# Patient Record
Sex: MALE | Race: WHITE | URBAN - METROPOLITAN AREA
[De-identification: names, ages, dates, MRNs, and addresses within clinical notes are randomized per-mention and may not be internally consistent; named-entity substitution may affect disease eponyms.]

---

## 2019-01-18 ENCOUNTER — OFFICE VISIT (OUTPATIENT)
Dept: NEUROLOGY | Facility: CLINIC | Age: 44
End: 2019-01-18
Payer: COMMERCIAL

## 2019-01-18 VITALS
BODY MASS INDEX: 30.1 KG/M2 | SYSTOLIC BLOOD PRESSURE: 118 MMHG | HEART RATE: 72 BPM | RESPIRATION RATE: 17 BRPM | HEIGHT: 71 IN | DIASTOLIC BLOOD PRESSURE: 82 MMHG | WEIGHT: 215 LBS

## 2019-01-18 DIAGNOSIS — M79.18 MYOFASCIAL PAIN: Primary | ICD-10-CM

## 2019-01-18 DIAGNOSIS — M47.812 SPONDYLOSIS OF CERVICAL REGION WITHOUT MYELOPATHY OR RADICULOPATHY: ICD-10-CM

## 2019-01-18 PROCEDURE — 99214 OFFICE O/P EST MOD 30 MIN: CPT | Performed by: PHYSICAL MEDICINE & REHABILITATION

## 2019-01-18 RX ORDER — FLUTICASONE PROPIONATE AND SALMETEROL 100; 50 UG/1; UG/1
1 POWDER RESPIRATORY (INHALATION) 2 TIMES DAILY
COMMUNITY

## 2019-01-18 RX ORDER — MONTELUKAST SODIUM 10 MG/1
10 TABLET ORAL NIGHTLY
COMMUNITY

## 2019-01-18 RX ORDER — DULOXETIN HYDROCHLORIDE 60 MG/1
60 CAPSULE, DELAYED RELEASE ORAL DAILY
Qty: 30 CAPSULE | Refills: 1 | Status: SHIPPED | OUTPATIENT
Start: 2019-01-18 | End: 2019-03-01

## 2019-01-18 RX ORDER — DULOXETIN HYDROCHLORIDE 30 MG/1
30 CAPSULE, DELAYED RELEASE ORAL DAILY
COMMUNITY
End: 2019-01-18

## 2019-01-18 RX ORDER — DICLOXACILLIN SODIUM 250 MG/1
250 CAPSULE ORAL 4 TIMES DAILY
COMMUNITY
End: 2019-03-01

## 2019-01-18 RX ORDER — CHLORAL HYDRATE 500 MG
1000 CAPSULE ORAL DAILY
COMMUNITY

## 2019-01-18 NOTE — PROGRESS NOTES
[address]  Progress Note    CHIEF COMPLAINT:  Patient presents with:  Neck Pain: LOV 12/11/18 Pt states that his neck is stiff, denies any pain at this time.  Pt mentioned that he had a fall over the weekend Disp:  Rfl:    Dicloxacillin Sodium 250 MG Oral Cap Take 250 mg by mouth 4 (four) times daily. Disp:  Rfl:    omega-3 fatty acids 1000 MG Oral Cap Take 1,000 mg by mouth daily. Disp:  Rfl:    Misc Natural Products ([facility]) Oral Cap Take by mouth.  Dis Plain x-rays of the cervical spine show minimal spondylosis. 3. An EMG of the right upper extremity from 2015 was normal.    ASSESSMENT AND PLAN:  1. Myofascial pain  Is doing relatively well. Today I recommended we increase his Cymbalta to 60 mg/day.

## 2019-01-18 NOTE — PATIENT INSTRUCTIONS
[address]  2010 [facility], [address] [place] [address]  Dept: [phone]    Kamini Ga MD  Physical Medicine    PLEASE READ CAREFULLY AND COMPLETELY FOR YOUR KNOWLEDGE AND SAFETY      Facet Joint Inj · Minor side effects occur in approximately 1-5% of patients and can last several days. These can include heartburn, increased appetite, trouble sleeping, low grade fever, redness of the face, headache, and minor pain at the injection site.   · Rare but po

## 2019-02-16 PROBLEM — M79.18 MYOFASCIAL PAIN: Status: ACTIVE | Noted: 2019-02-16

## 2019-02-16 PROBLEM — M47.812 SPONDYLOSIS OF CERVICAL REGION WITHOUT MYELOPATHY OR RADICULOPATHY: Status: ACTIVE | Noted: 2019-02-16

## 2019-02-28 ENCOUNTER — MED REC SCAN ONLY (OUTPATIENT)
Dept: NEUROLOGY | Facility: CLINIC | Age: 44
End: 2019-02-28

## 2019-03-01 ENCOUNTER — TELEPHONE (OUTPATIENT)
Dept: NEUROLOGY | Facility: CLINIC | Age: 44
End: 2019-03-01

## 2019-03-01 ENCOUNTER — OFFICE VISIT (OUTPATIENT)
Dept: NEUROLOGY | Facility: CLINIC | Age: 44
End: 2019-03-01
Payer: COMMERCIAL

## 2019-03-01 VITALS
HEIGHT: 71 IN | SYSTOLIC BLOOD PRESSURE: 118 MMHG | BODY MASS INDEX: 29.96 KG/M2 | DIASTOLIC BLOOD PRESSURE: 82 MMHG | WEIGHT: 214 LBS | HEART RATE: 80 BPM

## 2019-03-01 DIAGNOSIS — M47.812 SPONDYLOSIS OF CERVICAL REGION WITHOUT MYELOPATHY OR RADICULOPATHY: ICD-10-CM

## 2019-03-01 DIAGNOSIS — M79.18 MYOFASCIAL PAIN: Primary | ICD-10-CM

## 2019-03-01 PROCEDURE — 99214 OFFICE O/P EST MOD 30 MIN: CPT | Performed by: PHYSICAL MEDICINE & REHABILITATION

## 2019-03-01 RX ORDER — DICLOFENAC SODIUM 75 MG/1
75 TABLET, DELAYED RELEASE ORAL AS NEEDED
COMMUNITY
Start: 2018-06-20 | End: 2019-03-01

## 2019-03-01 RX ORDER — ALBUTEROL SULFATE 90 UG/1
2 AEROSOL, METERED RESPIRATORY (INHALATION)
COMMUNITY
Start: 2019-02-15

## 2019-03-01 RX ORDER — DULOXETIN HYDROCHLORIDE 60 MG/1
60 CAPSULE, DELAYED RELEASE ORAL DAILY
Qty: 90 CAPSULE | Refills: 1 | Status: SHIPPED | OUTPATIENT
Start: 2019-03-01 | End: 2019-07-26

## 2019-03-01 NOTE — TELEPHONE ENCOUNTER
Called [facility] for authorization of approval of C45, C56, C67 bilateral Facet injections cpt codes [id number], [id number], [id number]. Per [facility] automated telephone system, no authorization is required. No Reference number was provided  Will inform Nursing.

## 2019-03-01 NOTE — PROGRESS NOTES
[address]  Progress Note    CHIEF COMPLAINT:  Patient presents with:  Neck Pain: LOV: 1/18/19. F/U on neck pain. Patient states that he has been doing okay since last visit.  Patient states that he still has Particles Take 1 capsule (60 mg total) by mouth daily. Disp: 90 capsule Rfl: 1   Montelukast Sodium 10 MG Oral Tab Take 10 mg by mouth nightly.  Disp:  Rfl:    fluticasone-salmeterol 100-50 MCG/DOSE Inhalation Aerosol Powder, Breath Activated Inhale 1 puff better on Cymbalta. He already is doing acupuncture as needed and e-stim and traction. I think that is about all we can do other than eliminate stress, which is impossible. He will continue Cymbalta indefinitely.   I gave him a 90-day supply of 60 mg tab

## 2019-03-04 NOTE — TELEPHONE ENCOUNTER
Patient has been scheduled for a C45, C56, C67 bilateral facet injections on 04/04/19 at the [facility]. Medications and allergies reviewed.  Patient informed to hold aspirins, nsaids, blood thinners, multivitamins, vitamin E and fish oils 3-7 days prior to proce

## 2019-03-13 ENCOUNTER — HOSPITAL (OUTPATIENT)
Dept: OTHER | Age: 44
End: 2019-03-13
Attending: EMERGENCY MEDICINE

## 2019-03-21 RX ORDER — DULOXETIN HYDROCHLORIDE 60 MG/1
60 CAPSULE, DELAYED RELEASE ORAL DAILY
Qty: 30 CAPSULE | Refills: 0 | OUTPATIENT
Start: 2019-03-21

## 2019-03-21 NOTE — TELEPHONE ENCOUNTER
Duloxetine 60mg. Take 1 capsule daily. #30. No refills.     Last filled-3/1/2019 filled for 6 month supply    RX denied
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
## 2019-04-16 ENCOUNTER — OFFICE VISIT (OUTPATIENT)
Dept: NEUROLOGY | Facility: CLINIC | Age: 44
End: 2019-04-16
Payer: COMMERCIAL

## 2019-04-16 VITALS
RESPIRATION RATE: 16 BRPM | DIASTOLIC BLOOD PRESSURE: 80 MMHG | HEART RATE: 80 BPM | SYSTOLIC BLOOD PRESSURE: 122 MMHG | BODY MASS INDEX: 30.1 KG/M2 | WEIGHT: 215 LBS | HEIGHT: 71 IN

## 2019-04-16 DIAGNOSIS — R20.2 NUMBNESS AND TINGLING IN BOTH HANDS: ICD-10-CM

## 2019-04-16 DIAGNOSIS — S43.401A SPRAIN OF RIGHT SHOULDER, UNSPECIFIED SHOULDER SPRAIN TYPE, INITIAL ENCOUNTER: ICD-10-CM

## 2019-04-16 DIAGNOSIS — M47.812 SPONDYLOSIS OF CERVICAL REGION WITHOUT MYELOPATHY OR RADICULOPATHY: ICD-10-CM

## 2019-04-16 DIAGNOSIS — M79.18 MYOFASCIAL PAIN: ICD-10-CM

## 2019-04-16 DIAGNOSIS — S13.9XXA NECK SPRAIN, INITIAL ENCOUNTER: Primary | ICD-10-CM

## 2019-04-16 DIAGNOSIS — S33.5XXA LUMBAR SPRAIN, INITIAL ENCOUNTER: ICD-10-CM

## 2019-04-16 DIAGNOSIS — R20.0 NUMBNESS AND TINGLING IN BOTH HANDS: ICD-10-CM

## 2019-04-16 PROCEDURE — 99214 OFFICE O/P EST MOD 30 MIN: CPT | Performed by: PHYSICAL MEDICINE & REHABILITATION

## 2019-04-16 RX ORDER — CYCLOBENZAPRINE HCL 10 MG
10 TABLET ORAL 3 TIMES DAILY PRN
COMMUNITY
Start: 2019-03-21

## 2019-04-16 RX ORDER — MELOXICAM 15 MG/1
15 TABLET ORAL DAILY
COMMUNITY
Start: 2019-03-21

## 2019-04-16 RX ORDER — PREDNISONE 10 MG/1
TABLET ORAL
Qty: 28 TABLET | Refills: 0 | Status: SHIPPED | OUTPATIENT
Start: 2019-04-16 | End: 2019-07-26 | Stop reason: ALTCHOICE
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
## 2019-06-27 ENCOUNTER — TELEPHONE (OUTPATIENT)
Dept: NEUROLOGY | Facility: CLINIC | Age: 44
End: 2019-06-27

## 2019-06-27 NOTE — TELEPHONE ENCOUNTER
Received disc of MRI of cervical spine done 6/11/19 at [facility] in [place] on 's desk for review. Received disc of MRI of Lumbar spine done 5/21/19 at [facility] in [place] on 's desk for review.     Disc loaded to [facility] PACs a

## 2019-06-28 ENCOUNTER — OFFICE VISIT (OUTPATIENT)
Dept: NEUROLOGY | Facility: CLINIC | Age: 44
End: 2019-06-28
Payer: COMMERCIAL

## 2019-06-28 VITALS
SYSTOLIC BLOOD PRESSURE: 117 MMHG | BODY MASS INDEX: 30.1 KG/M2 | RESPIRATION RATE: 18 BRPM | DIASTOLIC BLOOD PRESSURE: 81 MMHG | WEIGHT: 215 LBS | HEART RATE: 94 BPM | HEIGHT: 71 IN

## 2019-06-28 DIAGNOSIS — S43.401A SPRAIN OF RIGHT SHOULDER, UNSPECIFIED SHOULDER SPRAIN TYPE, INITIAL ENCOUNTER: ICD-10-CM

## 2019-06-28 DIAGNOSIS — S13.9XXA NECK SPRAIN, INITIAL ENCOUNTER: Primary | ICD-10-CM

## 2019-06-28 DIAGNOSIS — S33.5XXA LUMBAR SPRAIN, INITIAL ENCOUNTER: ICD-10-CM

## 2019-06-28 DIAGNOSIS — M47.812 SPONDYLOSIS OF CERVICAL REGION WITHOUT MYELOPATHY OR RADICULOPATHY: ICD-10-CM

## 2019-06-28 DIAGNOSIS — R20.2 NUMBNESS AND TINGLING IN BOTH HANDS: ICD-10-CM

## 2019-06-28 DIAGNOSIS — R20.0 NUMBNESS AND TINGLING IN BOTH HANDS: ICD-10-CM

## 2019-06-28 DIAGNOSIS — M79.18 MYOFASCIAL PAIN: ICD-10-CM

## 2019-06-28 PROCEDURE — 99214 OFFICE O/P EST MOD 30 MIN: CPT | Performed by: PHYSICAL MEDICINE & REHABILITATION
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
## 2019-07-03 ENCOUNTER — TELEPHONE (OUTPATIENT)
Dept: NEUROLOGY | Facility: CLINIC | Age: 44
End: 2019-07-03

## 2019-07-03 NOTE — TELEPHONE ENCOUNTER
Received call from [facility] spoke to Eduardo CHOWDHURY who stated patient call his plan directly to confirm if  authorization of approval was required for Bilateral C4-5, C5-6 and C6-7 facet injections cpt codes 08984,[id number]  Per Darrell Luciano  No prior authorization is

## 2019-07-11 ENCOUNTER — MED REC SCAN ONLY (OUTPATIENT)
Dept: NEUROLOGY | Facility: CLINIC | Age: 44
End: 2019-07-11

## 2019-07-11 ENCOUNTER — OFFICE VISIT (OUTPATIENT)
Dept: SURGERY | Facility: CLINIC | Age: 44
End: 2019-07-11
Payer: COMMERCIAL

## 2019-07-11 DIAGNOSIS — M47.812 SPONDYLOSIS OF CERVICAL REGION WITHOUT MYELOPATHY OR RADICULOPATHY: Primary | ICD-10-CM

## 2019-07-11 PROCEDURE — 99152 MOD SED SAME PHYS/QHP 5/>YRS: CPT | Performed by: PHYSICAL MEDICINE & REHABILITATION

## 2019-07-11 PROCEDURE — 64491 INJ PARAVERT F JNT C/T 2 LEV: CPT | Performed by: PHYSICAL MEDICINE & REHABILITATION

## 2019-07-11 PROCEDURE — 64492 INJ PARAVERT F JNT C/T 3 LEV: CPT | Performed by: PHYSICAL MEDICINE & REHABILITATION

## 2019-07-11 PROCEDURE — 99153 MOD SED SAME PHYS/QHP EA: CPT | Performed by: PHYSICAL MEDICINE & REHABILITATION

## 2019-07-11 PROCEDURE — 64490 INJ PARAVERT F JNT C/T 1 LEV: CPT | Performed by: PHYSICAL MEDICINE & REHABILITATION

## 2019-07-18 NOTE — PROCEDURES
Preoperative Diagnosis:  (M47.812) Spondylosis of cervical region without myelopathy or radiculopathy  (primary encounter diagnosis)       Postoperative Diagnosis:  (M47.812) Spondylosis of cervical region without myelopathy or radiculopathy  (primary enco

## 2019-07-26 ENCOUNTER — OFFICE VISIT (OUTPATIENT)
Dept: NEUROLOGY | Facility: CLINIC | Age: 44
End: 2019-07-26
Payer: COMMERCIAL

## 2019-07-26 VITALS
DIASTOLIC BLOOD PRESSURE: 84 MMHG | RESPIRATION RATE: 16 BRPM | HEIGHT: 71 IN | WEIGHT: 220 LBS | BODY MASS INDEX: 30.8 KG/M2 | SYSTOLIC BLOOD PRESSURE: 132 MMHG | HEART RATE: 88 BPM

## 2019-07-26 DIAGNOSIS — M79.18 MYOFASCIAL PAIN: ICD-10-CM

## 2019-07-26 DIAGNOSIS — R20.2 NUMBNESS AND TINGLING IN BOTH HANDS: ICD-10-CM

## 2019-07-26 DIAGNOSIS — M47.812 SPONDYLOSIS OF CERVICAL REGION WITHOUT MYELOPATHY OR RADICULOPATHY: ICD-10-CM

## 2019-07-26 DIAGNOSIS — S13.9XXA NECK SPRAIN, INITIAL ENCOUNTER: Primary | ICD-10-CM

## 2019-07-26 DIAGNOSIS — R20.0 NUMBNESS AND TINGLING IN BOTH HANDS: ICD-10-CM

## 2019-07-26 DIAGNOSIS — S43.401A SPRAIN OF RIGHT SHOULDER, UNSPECIFIED SHOULDER SPRAIN TYPE, INITIAL ENCOUNTER: ICD-10-CM

## 2019-07-26 PROCEDURE — 99214 OFFICE O/P EST MOD 30 MIN: CPT | Performed by: PHYSICAL MEDICINE & REHABILITATION

## 2019-07-26 RX ORDER — DULOXETIN HYDROCHLORIDE 60 MG/1
60 CAPSULE, DELAYED RELEASE ORAL DAILY
Qty: 90 CAPSULE | Refills: 1 | Status: SHIPPED | OUTPATIENT
Start: 2019-07-26 | End: 2019-07-26 | Stop reason: CLARIF

## 2019-07-26 RX ORDER — DULOXETIN HYDROCHLORIDE 60 MG/1
60 CAPSULE, DELAYED RELEASE ORAL DAILY
Qty: 90 CAPSULE | Refills: 1 | Status: SHIPPED | OUTPATIENT
Start: 2019-07-26 | End: 2020-01-22

## 2019-07-26 NOTE — PROGRESS NOTES
[address]  Progress Note    CHIEF COMPLAINT:  Patient presents with:  Neck Pain: Patient presents for follow up on  Bilateral C45, C56 and C67 Facet injection 7/11/19, got 50-60% relief, still c/o stiffness Particles Take 1 capsule (60 mg total) by mouth daily. 90 capsule 1   • Meloxicam 15 MG Oral Tab Take 15 mg by mouth daily. • Cyclobenzaprine HCl 10 MG Oral Tab Take 10 mg by mouth 3 (three) times daily as needed.      • Albuterol Sulfate  (90 Ba upper extremities  Reflexes: Normal upper extremities  Spurling's sign: neg        Data    Radiology Imagin.  films of a lumbar MRI on internal PACS showing DDD and spondylosis.  No significant nerve root impingement or central canal stenosis.    2.  f

## 2019-09-03 ENCOUNTER — TELEPHONE (OUTPATIENT)
Dept: NEUROLOGY | Facility: CLINIC | Age: 44
End: 2019-09-03

## 2019-09-03 ENCOUNTER — OFFICE VISIT (OUTPATIENT)
Dept: NEUROLOGY | Facility: CLINIC | Age: 44
End: 2019-09-03
Payer: COMMERCIAL

## 2019-09-03 VITALS
HEART RATE: 76 BPM | RESPIRATION RATE: 16 BRPM | SYSTOLIC BLOOD PRESSURE: 108 MMHG | WEIGHT: 215 LBS | BODY MASS INDEX: 30.1 KG/M2 | DIASTOLIC BLOOD PRESSURE: 76 MMHG | HEIGHT: 71 IN

## 2019-09-03 DIAGNOSIS — S13.9XXA NECK SPRAIN, INITIAL ENCOUNTER: Primary | ICD-10-CM

## 2019-09-03 DIAGNOSIS — M47.812 CERVICAL SPONDYLOSIS: ICD-10-CM

## 2019-09-03 DIAGNOSIS — S43.401A SPRAIN OF RIGHT SHOULDER, UNSPECIFIED SHOULDER SPRAIN TYPE, INITIAL ENCOUNTER: ICD-10-CM

## 2019-09-03 DIAGNOSIS — M79.18 MYOFASCIAL PAIN: ICD-10-CM

## 2019-09-03 PROCEDURE — 99214 OFFICE O/P EST MOD 30 MIN: CPT | Performed by: PHYSICAL MEDICINE & REHABILITATION

## 2019-09-03 RX ORDER — ERGOCALCIFEROL 1.25 MG/1
50000 CAPSULE ORAL
COMMUNITY
Start: 2019-08-15

## 2019-09-03 NOTE — TELEPHONE ENCOUNTER
Called [facility] for authorization of approval of Bilateral C4-5, C5-6 and C6-7 facet injections cpt codes 16877-57, [id number], [id number]. Per telephone automated system, no authorization is required Reference number was not provided. Will inform Nursing.

## 2019-09-03 NOTE — PROGRESS NOTES
[address]  Progress Note    CHIEF COMPLAINT:  Patient presents with:  Neck Pain: Patient present for follow up on Neck pain. LOV: 7/26/19. MRI of neck and lower back 6/11/19 at [facility].  Sleep is improved, bu HISTORY:  History reviewed. No pertinent past medical history.     SURGICAL HISTORY:  Past Surgical History:   Procedure Laterality Date   • BACK SURGERY  2017    lower back    • KNEE SURGERY Right 2016       SOCIAL HISTORY:   Social History    Occupational Stiffness: denies  Painful Joints: denies   Neurological  Loss of Strength Since last Visit: denies  Tingling/Numbness: denies            All other systems reviewed and are negative. Pertinent positives and negatives noted in the HPI.         PHYSICAL EXAM: Placed This Encounter      ergocalciferol 26704 units Oral Cap      RTC    Return for 2 week post injection follow up. Discharge Instructions were provided as documented in AVS summary. The patient was in agreement with the assessment and plan.   All

## 2019-09-05 ENCOUNTER — MED REC SCAN ONLY (OUTPATIENT)
Dept: NEUROLOGY | Facility: CLINIC | Age: 44
End: 2019-09-05

## 2019-09-11 NOTE — TELEPHONE ENCOUNTER
Patient has been scheduled for a Bilateral C4-5, C5-6 and C6-7 facet injections on 10/10/19 at the [facility]. Medications and allergies reviewed.  Patient informed to hold aspirins, nsaids, blood thinners, multivitamins, vitamin E and fish oils 3-7 days prior to

## 2019-10-10 ENCOUNTER — OFFICE VISIT (OUTPATIENT)
Dept: SURGERY | Facility: CLINIC | Age: 44
End: 2019-10-10
Payer: COMMERCIAL

## 2019-10-10 DIAGNOSIS — M47.812 SPONDYLOSIS OF CERVICAL REGION WITHOUT MYELOPATHY OR RADICULOPATHY: Primary | ICD-10-CM

## 2019-10-10 PROCEDURE — 64492 INJ PARAVERT F JNT C/T 3 LEV: CPT | Performed by: PHYSICAL MEDICINE & REHABILITATION

## 2019-10-10 PROCEDURE — 64491 INJ PARAVERT F JNT C/T 2 LEV: CPT | Performed by: PHYSICAL MEDICINE & REHABILITATION

## 2019-10-10 PROCEDURE — 99152 MOD SED SAME PHYS/QHP 5/>YRS: CPT | Performed by: PHYSICAL MEDICINE & REHABILITATION

## 2019-10-10 PROCEDURE — 99153 MOD SED SAME PHYS/QHP EA: CPT | Performed by: PHYSICAL MEDICINE & REHABILITATION

## 2019-10-10 PROCEDURE — 64490 INJ PARAVERT F JNT C/T 1 LEV: CPT | Performed by: PHYSICAL MEDICINE & REHABILITATION

## 2019-11-01 ENCOUNTER — OFFICE VISIT (OUTPATIENT)
Dept: NEUROLOGY | Facility: CLINIC | Age: 44
End: 2019-11-01
Payer: COMMERCIAL

## 2019-11-01 VITALS
WEIGHT: 215 LBS | HEART RATE: 84 BPM | RESPIRATION RATE: 20 BRPM | HEIGHT: 71 IN | SYSTOLIC BLOOD PRESSURE: 108 MMHG | BODY MASS INDEX: 30.1 KG/M2 | DIASTOLIC BLOOD PRESSURE: 72 MMHG

## 2019-11-01 DIAGNOSIS — M79.18 MYOFASCIAL PAIN: ICD-10-CM

## 2019-11-01 DIAGNOSIS — S13.9XXA NECK SPRAIN, INITIAL ENCOUNTER: Primary | ICD-10-CM

## 2019-11-01 PROCEDURE — 99213 OFFICE O/P EST LOW 20 MIN: CPT | Performed by: PHYSICAL MEDICINE & REHABILITATION

## 2019-11-01 NOTE — PROGRESS NOTES
[address]  Progress Note    CHIEF COMPLAINT:  Patient presents with:  Neck Pain: Patient presents for follow up on neck pain. Patient reports that he is much better. Does not have raditing pain.  Rates pain fluticasone-salmeterol 100-50 MCG/DOSE Inhalation Aerosol Powder, Breath Activated, Inhale 1 puff into the lungs 2 (two) times daily. , Disp: , Rfl:   omega-3 fatty acids 1000 MG Oral Cap, Take 1,000 mg by mouth daily. , Disp: , Rfl:   Misc Natural Product stenosis. 2.  films of a cervical MRI on internal PACS showing DDD and spondylosis.  No significant nerve root impingement or central canal stenosis. ASSESSMENT AND PLAN:  1.  Neck sprain, initial encounter  Tasha Freda is doing well after facet injection

## 2020-02-18 RX ORDER — DULOXETIN HYDROCHLORIDE 60 MG/1
60 CAPSULE, DELAYED RELEASE ORAL DAILY
Qty: 90 CAPSULE | Refills: 0 | Status: SHIPPED | OUTPATIENT
Start: 2020-02-18 | End: 2020-05-20

## 2020-02-18 NOTE — TELEPHONE ENCOUNTER
Medication request: Duloxetine 60 mg, Take 1 capsule by mouth daily.  Qt 30 Refills 0    LOV:11/1/2019  NOV:5/1/2020    Last refill:7/26/2019

## 2020-05-20 RX ORDER — DULOXETIN HYDROCHLORIDE 60 MG/1
CAPSULE, DELAYED RELEASE ORAL
Qty: 90 CAPSULE | Refills: 0 | Status: SHIPPED | OUTPATIENT
Start: 2020-05-20 | End: 2020-08-11

## 2020-05-20 NOTE — TELEPHONE ENCOUNTER
Refill request for duloxetine 60 mg, take 1 cap daily, #90, no refills    LOV: 11/1/19  NOV: 6/12/20  Last refilled on 2/18/20

## 2020-08-11 RX ORDER — DULOXETIN HYDROCHLORIDE 60 MG/1
CAPSULE, DELAYED RELEASE ORAL
Qty: 90 CAPSULE | Refills: 3 | Status: SHIPPED | OUTPATIENT
Start: 2020-08-11 | End: 2021-08-12

## 2020-08-11 NOTE — TELEPHONE ENCOUNTER
Refill request for duloxetine 60 mg, take 1 cap daily, #90, no refills    LOV: 11/1/19  NOV: None  Last refilled on 5/20/20

## 2021-08-06 NOTE — TELEPHONE ENCOUNTER
Medication request: Duloxetine 60mg TAKE ONE CAPSULE BY MOUTH ONE TIME DAILY    LOV: 11/01/19  NOV: 08/31/21    [place] 08/11/20 #90 r-3

## 2021-08-06 NOTE — TELEPHONE ENCOUNTER
Spoke with pt and scheduled appt on 8/31. States he has about a week left of medication and is concerned about pain coming back when he runs out next week.  Please advise

## 2021-08-12 RX ORDER — DULOXETIN HYDROCHLORIDE 60 MG/1
CAPSULE, DELAYED RELEASE ORAL
Qty: 90 CAPSULE | Refills: 0 | Status: SHIPPED | OUTPATIENT
Start: 2021-08-12 | End: 2021-11-26

## 2021-09-17 ENCOUNTER — OFFICE VISIT (OUTPATIENT)
Dept: PHYSICAL MEDICINE AND REHAB | Facility: CLINIC | Age: 46
End: 2021-09-17
Payer: COMMERCIAL

## 2021-09-17 VITALS
HEART RATE: 86 BPM | DIASTOLIC BLOOD PRESSURE: 72 MMHG | HEIGHT: 71 IN | WEIGHT: 222 LBS | BODY MASS INDEX: 31.08 KG/M2 | SYSTOLIC BLOOD PRESSURE: 108 MMHG | OXYGEN SATURATION: 98 %

## 2021-09-17 DIAGNOSIS — M79.18 MYOFASCIAL PAIN: Primary | ICD-10-CM

## 2021-09-17 PROCEDURE — 99213 OFFICE O/P EST LOW 20 MIN: CPT | Performed by: PHYSICAL MEDICINE & REHABILITATION

## 2021-09-17 PROCEDURE — 3074F SYST BP LT 130 MM HG: CPT | Performed by: PHYSICAL MEDICINE & REHABILITATION

## 2021-09-17 PROCEDURE — 3078F DIAST BP <80 MM HG: CPT | Performed by: PHYSICAL MEDICINE & REHABILITATION

## 2021-09-17 PROCEDURE — 3008F BODY MASS INDEX DOCD: CPT | Performed by: PHYSICAL MEDICINE & REHABILITATION

## 2021-09-17 NOTE — PROGRESS NOTES
[address]  Progress Note    CHIEF COMPLAINT:  Patient presents with:  Neck Pain: LOV 11/1/19 Pt comes in for f/u neck pain. Still has knots/stiffness in neck. Perri Sahil Has N/T in both hands including all digits.  St Products ([facility]) Oral Cap Take by mouth. • ergocalciferol 68754 units Oral Cap Take 50,000 Int'l Units by mouth.  (Patient not taking: Reported on 9/17/2021)     • Cyclobenzaprine HCl 10 MG Oral Tab Take 10 mg by mouth 3 (three) times daily as ne He is just checking back with me. No new medical problems, Cymbalta is working well. He will continue with that indefinitely. Return in 1 year. No orders of the defined types were placed in this encounter.         Discharge Instructions were provided as

## 2021-11-29 RX ORDER — DULOXETIN HYDROCHLORIDE 60 MG/1
60 CAPSULE, DELAYED RELEASE ORAL DAILY
Qty: 90 CAPSULE | Refills: 3 | Status: SHIPPED | OUTPATIENT
Start: 2021-11-29 | End: 2022-11-24

## 2021-11-29 NOTE — TELEPHONE ENCOUNTER
Medication request:  DULOXETINE 60 MG Oral Cap DR Particles  Sig:   TAKE ONE CAPSULE BY MOUTH ONE TIME DAILY     LOV: 09/17/2021  NOV: 09/16/2022    [place]: 08/12/2021  Qty: 90, #R:0, 90 D supply

## 2021-12-01 RX ORDER — DULOXETIN HYDROCHLORIDE 60 MG/1
CAPSULE, DELAYED RELEASE ORAL
Qty: 90 CAPSULE | Refills: 0 | OUTPATIENT
Start: 2021-12-01

## 2022-10-07 ENCOUNTER — OFFICE VISIT (OUTPATIENT)
Dept: PHYSICAL MEDICINE AND REHAB | Facility: CLINIC | Age: 47
End: 2022-10-07
Payer: COMMERCIAL

## 2022-10-07 VITALS — HEART RATE: 82 BPM | WEIGHT: 238 LBS | OXYGEN SATURATION: 97 % | BODY MASS INDEX: 33 KG/M2

## 2022-10-07 DIAGNOSIS — M79.18 MYOFASCIAL PAIN: Primary | ICD-10-CM

## 2022-10-07 PROCEDURE — 99213 OFFICE O/P EST LOW 20 MIN: CPT | Performed by: PHYSICAL MEDICINE & REHABILITATION

## 2022-11-08 RX ORDER — DULOXETIN HYDROCHLORIDE 60 MG/1
60 CAPSULE, DELAYED RELEASE ORAL DAILY
Qty: 90 CAPSULE | Refills: 3 | Status: SHIPPED | OUTPATIENT
Start: 2022-11-08 | End: 2023-11-03

## 2023-01-24 ENCOUNTER — OFFICE VISIT (OUTPATIENT)
Facility: LOCATION | Age: 48
End: 2023-01-24
Payer: COMMERCIAL

## 2023-01-24 VITALS — HEART RATE: 82 BPM | TEMPERATURE: 98 F

## 2023-01-24 DIAGNOSIS — Z80.0 FAMILY HISTORY OF COLON CANCER IN FATHER: ICD-10-CM

## 2023-01-24 DIAGNOSIS — L29.0 PRURITUS ANI: ICD-10-CM

## 2023-01-24 DIAGNOSIS — K64.4 ANAL SKIN TAG: ICD-10-CM

## 2023-01-24 DIAGNOSIS — K60.1 CHRONIC POSTERIOR ANAL FISSURE: Primary | ICD-10-CM

## 2023-01-24 PROCEDURE — 99244 OFF/OP CNSLTJ NEW/EST MOD 40: CPT | Performed by: COLON & RECTAL SURGERY

## 2023-01-25 PROBLEM — L29.0 PRURITUS ANI: Status: ACTIVE | Noted: 2023-01-25

## 2023-01-25 PROBLEM — Z80.0 FAMILY HISTORY OF COLON CANCER IN FATHER: Status: ACTIVE | Noted: 2023-01-25

## 2023-01-25 PROBLEM — K64.4 ANAL SKIN TAG: Status: ACTIVE | Noted: 2023-01-25

## 2023-01-25 PROBLEM — K60.1 CHRONIC POSTERIOR ANAL FISSURE: Status: ACTIVE | Noted: 2023-01-25

## 2023-12-15 ENCOUNTER — OFFICE VISIT (OUTPATIENT)
Dept: PHYSICAL MEDICINE AND REHAB | Facility: CLINIC | Age: 48
End: 2023-12-15
Payer: COMMERCIAL

## 2023-12-15 VITALS — HEIGHT: 71 IN | WEIGHT: 225 LBS | BODY MASS INDEX: 31.5 KG/M2

## 2023-12-15 DIAGNOSIS — M79.18 MYOFASCIAL PAIN: Primary | ICD-10-CM

## 2023-12-15 PROCEDURE — 99213 OFFICE O/P EST LOW 20 MIN: CPT | Performed by: PHYSICAL MEDICINE & REHABILITATION

## 2023-12-15 PROCEDURE — 3008F BODY MASS INDEX DOCD: CPT | Performed by: PHYSICAL MEDICINE & REHABILITATION

## 2024-01-15 ENCOUNTER — OFFICE VISIT (OUTPATIENT)
Dept: PHYSICAL MEDICINE AND REHAB | Facility: CLINIC | Age: 49
End: 2024-01-15
Payer: COMMERCIAL

## 2024-01-15 VITALS — OXYGEN SATURATION: 98 % | WEIGHT: 225 LBS | HEART RATE: 70 BPM | BODY MASS INDEX: 31 KG/M2

## 2024-01-15 DIAGNOSIS — M77.8 EXTENSOR TENDINITIS OF HAND: Primary | ICD-10-CM

## 2024-01-15 DIAGNOSIS — S43.431A GLENOID LABRAL TEAR, RIGHT, INITIAL ENCOUNTER: ICD-10-CM

## 2024-01-15 RX ORDER — MELOXICAM 15 MG/1
15 TABLET ORAL DAILY
Qty: 30 TABLET | Refills: 0 | Status: SHIPPED | OUTPATIENT
Start: 2024-01-15

## 2024-01-15 NOTE — PROGRESS NOTES
[facility]  Progress Note    CHIEF COMPLAINT:    Chief Complaint   Patient presents with    Wrist Pain     12/15/23 LOV. Patient is here for R wrist pain and swelling. Pain began 01/01/24 and denies injury. He is also having R tricep pain. Numbness in R 3rd finger that has gotten better. Pain 5/10. Duloxetine daily. He has a bump on R wrist.        History of Present Illness:  David Barrientos is a 48 year old male who presents today for follow up for symptoms of insidious onset right focal painful radial distal forearm swelling along with right shoulder soreness.  There is also numbness of the third finger for a short time.  This happened insidiously on the first of the year.  He has been icing it and the wrist has been feeling better.  The shoulder is still sore.  He does have a history of a labral tear chronically which he has treated with a shoulder injection in the past and deferred surgical intervention.  He does lift weights regularly, does not recall a recent injury.    PAST MEDICAL HISTORY:  No past medical history on file.    SURGICAL HISTORY:  Past Surgical History:   Procedure Laterality Date    BACK SURGERY  2017    lower back     KNEE SURGERY Right 2016       SOCIAL HISTORY:   Social History     Occupational History    Not on file   Tobacco Use    Smoking status: Never    Smokeless tobacco: Never   Vaping Use    Vaping Use: Never used   Substance and Sexual Activity    Alcohol use: No    Drug use: Never    Sexual activity: Not on file       CURRENT MEDICATIONS:   Current Outpatient Medications   Medication Sig Dispense Refill    Meloxicam 15 MG Oral Tab Take 1 tablet (15 mg total) by mouth daily. 30 tablet 0    ergocalciferol 31803 units Oral Cap Take 50,000 Int'l Units by mouth. (Patient not taking: Reported on 9/17/2021)      Meloxicam 15 MG Oral Tab Take 15 mg by mouth daily.      Cyclobenzaprine HCl 10 MG Oral Tab Take 10 mg by mouth 3 (three) times  daily as needed. (Patient not taking: Reported on 9/17/2021)      Albuterol Sulfate  (90 Base) MCG/ACT Inhalation Aero Soln Inhale 2 puffs into the lungs.      Montelukast Sodium 10 MG Oral Tab Take 10 mg by mouth nightly.      fluticasone-salmeterol 100-50 MCG/DOSE Inhalation Aerosol Powder, Breath Activated Inhale 1 puff into the lungs 2 (two) times daily.      omega-3 fatty acids 1000 MG Oral Cap Take 1,000 mg by mouth daily.      Misc Natural Products ([employer]) Oral Cap Take by mouth.         ALLERGIES:   Allergies   Allergen Reactions    Amoxicillin HIVES    Mold SHORTNESS OF BREATH    Penicillins HIVES             PHYSICAL EXAM:   Pulse 70   Wt 225 lb (102.1 kg)   SpO2 98%   BMI 31.38 kg/m²     Body mass index is 31.38 kg/m².      General: No immediate distress  Extremities: No upper extremity edema bilaterally, small area of focal swelling over the radial aspect of the distal forearm proximal to the wrist and over the dorsum as well.  Negative Finklestein's test.  No pain with manual muscle testing of each individual finger extensor.  Full and pain-free wrist range of motion in all directions.  Spine: full and painfree cervical ROM in all directions  Shoulders: Audible pop with range of motion of the right shoulder, positive Stewart impingement signs.  Neuro:   Cognition: alert & oriented x 3, attentive, comprehention intact, spontaneous speech intact  Strength:  Upper extremities have 5/5 strength  Sensation: Normal upper extremities  Reflexes: Normal upper extremities  Spurling's sign: neg  Tinel's sign: Negative over the wrist      Data    Radiology Imaging:  None for this condition    ASSESSMENT AND PLAN:  1. Extensor tendinitis of hand  He seems to have resolving extensor tendinitis of the hand, symptoms not brought on by examination today, has full range of motion and function.  Concerned he might have cancer, I do not think he has that.  Concerned he might have carpal tunnel syndrome, he  does not have that either.    2. Glenoid labral tear, right, initial encounter  He does seem to have symptomatic shoulder pain with Stewart maneuver.  Will start with Mobic.  If no better in 4 weeks consider PT, x-rays, etc.        RTC 4 weeks        The patient was in agreement with the assessment and plan.  All questions were answered.        Matthew Downing MD  Physical Medicine and Rehabilitation/Sports Medicine  [facility]

## 2024-01-31 RX ORDER — DULOXETIN HYDROCHLORIDE 60 MG/1
60 CAPSULE, DELAYED RELEASE ORAL DAILY
Qty: 90 CAPSULE | Refills: 0 | Status: SHIPPED | OUTPATIENT
Start: 2024-01-31 | End: 2025-01-25

## 2024-01-31 NOTE — TELEPHONE ENCOUNTER
Refill Request    Medication request: DULoxetine 60 MG Oral Cap DR Particles  Take 1 capsule (60 mg total) by mouth in the morning.     LOV:1/15/2024 Matthew Downing MD  Due back to clinic per last office note:  \"RTC 4 weeks \"  NOV: Visit date not found      [place]: 10/30/2023 #90    Urine drug screen (if applicable): N/A  Pain contract: N/A    LOV plan (if weaning or changing medications): No mention of Cymbalta at LOV  but per Dr. Downing's 12/15/2023 OV notes: \"His neck is doing great on Cymbalta 60, continue indefinitely.  His exam is normal for CTS, if worse would do EMG.  RTC 1 year. \"         Ok to fill per Dr. Downing's protocol.

## 2024-03-06 ENCOUNTER — OFFICE VISIT (OUTPATIENT)
Dept: PHYSICAL MEDICINE AND REHAB | Facility: CLINIC | Age: 49
End: 2024-03-06
Payer: COMMERCIAL

## 2024-03-06 VITALS — BODY MASS INDEX: 32.06 KG/M2 | WEIGHT: 229 LBS | HEIGHT: 71 IN

## 2024-03-06 DIAGNOSIS — S43.431A GLENOID LABRAL TEAR, RIGHT, INITIAL ENCOUNTER: ICD-10-CM

## 2024-03-06 DIAGNOSIS — S46.011A STRAIN OF RIGHT ROTATOR CUFF CAPSULE, INITIAL ENCOUNTER: Primary | ICD-10-CM

## 2024-03-06 PROCEDURE — 99214 OFFICE O/P EST MOD 30 MIN: CPT | Performed by: PHYSICAL MEDICINE & REHABILITATION

## 2024-03-06 PROCEDURE — 20610 DRAIN/INJ JOINT/BURSA W/O US: CPT | Performed by: PHYSICAL MEDICINE & REHABILITATION

## 2024-03-06 PROCEDURE — 3008F BODY MASS INDEX DOCD: CPT | Performed by: PHYSICAL MEDICINE & REHABILITATION

## 2024-03-06 RX ORDER — TRIAMCINOLONE ACETONIDE 40 MG/ML
80 INJECTION, SUSPENSION INTRA-ARTICULAR; INTRAMUSCULAR ONCE
Status: COMPLETED | OUTPATIENT
Start: 2024-03-06 | End: 2024-03-06

## 2024-03-06 RX ORDER — LIDOCAINE HYDROCHLORIDE 10 MG/ML
4 INJECTION, SOLUTION INFILTRATION; PERINEURAL ONCE
Status: COMPLETED | OUTPATIENT
Start: 2024-03-06 | End: 2024-03-06

## 2024-03-06 RX ADMIN — LIDOCAINE HYDROCHLORIDE 4 ML: 10 INJECTION, SOLUTION INFILTRATION; PERINEURAL at 15:19:00

## 2024-03-06 RX ADMIN — TRIAMCINOLONE ACETONIDE 80 MG: 40 INJECTION, SUSPENSION INTRA-ARTICULAR; INTRAMUSCULAR at 15:19:00

## 2024-03-06 NOTE — PROGRESS NOTES
[facility]  Progress Note    CHIEF COMPLAINT:    Chief Complaint   Patient presents with    Shoulder Pain     LOV 1/15/2024 Patient c/o right shoulder pain. C/o clicking sound when he bends his arm, pain has been on and off for years but worsened over the last month or two. Previously felt N/T in his right hand, does not at this time. LOP 5/10       History of Present Illness:  David Barrientos is a 49 year old male who presents today for follow up for symptoms of right shoulder pain.  He continues to have clicking of the right shoulder with painful range of motion.  Long ago he had a shoulder injection which helped significantly.  Symptoms insidiously worsened over the past several months.    PAST MEDICAL HISTORY:  No past medical history on file.    SURGICAL HISTORY:  Past Surgical History:   Procedure Laterality Date    BACK SURGERY  2017    lower back     KNEE SURGERY Right 2016       SOCIAL HISTORY:   Social History     Occupational History    Not on file   Tobacco Use    Smoking status: Never    Smokeless tobacco: Never   Vaping Use    Vaping Use: Never used   Substance and Sexual Activity    Alcohol use: No    Drug use: Never    Sexual activity: Not on file       CURRENT MEDICATIONS:   Current Outpatient Medications   Medication Sig Dispense Refill    DULOXETINE 60 MG Oral Cap DR Particles Take 1 capsule (60 mg total) by mouth in the morning 90 capsule 0    Meloxicam 15 MG Oral Tab Take 1 tablet (15 mg total) by mouth daily. 30 tablet 0    ergocalciferol 52002 units Oral Cap Take 50,000 Int'l Units by mouth. (Patient not taking: Reported on 9/17/2021)      Meloxicam 15 MG Oral Tab Take 15 mg by mouth daily.      Cyclobenzaprine HCl 10 MG Oral Tab Take 10 mg by mouth 3 (three) times daily as needed. (Patient not taking: Reported on 9/17/2021)      Albuterol Sulfate  (90 Base) MCG/ACT Inhalation Aero Soln Inhale 2 puffs into the lungs.      Montelukast  Sodium 10 MG Oral Tab Take 10 mg by mouth nightly.      fluticasone-salmeterol 100-50 MCG/DOSE Inhalation Aerosol Powder, Breath Activated Inhale 1 puff into the lungs 2 (two) times daily.      omega-3 fatty acids 1000 MG Oral Cap Take 1,000 mg by mouth daily.      Misc Natural Products ([facility]) Oral Cap Take by mouth.         ALLERGIES:   Allergies   Allergen Reactions    Amoxicillin HIVES    Mold SHORTNESS OF BREATH    Penicillins HIVES         PHYSICAL EXAM:   Ht 71\"   Wt 229 lb (103.9 kg)   BMI 31.94 kg/m²     Body mass index is 31.94 kg/m².      General: No immediate distress  Extremities: No upper extremity edema bilaterally   Spine: full and painfree cervical ROM in all directions  Shoulders: Palpable clicking noted with range of motion of the right shoulder, limited internal and external rotation, positive impingement maneuvers on the right  Neuro:   Cognition: alert & oriented x 3, attentive, comprehention intact, spontaneous speech intact  Strength:  Upper extremities have 5/5 strength  Sensation: Normal upper extremities  Reflexes: Normal upper extremities  Spurling's sign: neg        Data    Radiology Imaging:  No shoulder x-rays recently    ASSESSMENT AND PLAN:  1. Strain of right rotator cuff capsule, initial encounter  He is going out of town, has no time for physical therapy.  He agreed to undergo a shoulder injection today.  Will also obtain a plain film x-ray given chronicity of shoulder pain.  - SPECIALTY (OTHER) - EXTERNAL  - XR SHOULDER, COMPLETE (MIN 2 VIEWS), RIGHT (CPT=73030); Future  - triamcinolone acetonide (Kenalog-40) 40 MG/ML injection 80 mg  - lidocaine (Xylocaine) 1 % injection    2. Glenoid labral tear, right, initial encounter  As above        RTC 4 to 6 weeks        The patient was in agreement with the assessment and plan.  All questions were answered.        Matthew Downing MD  Physical Medicine and Rehabilitation/Sports Medicine  [facility]

## 2024-03-07 ENCOUNTER — TELEPHONE (OUTPATIENT)
Dept: PHYSICAL MEDICINE AND REHAB | Facility: CLINIC | Age: 49
End: 2024-03-07

## 2024-03-07 NOTE — PROCEDURES
Shoulder injection  Location: right  After discussing benefits and possible side effects, we proceeded with a subacromial bursa injection. The patient was consented.  The patient was seated, and the posterolateral shoulder was palpated. The skin was sterilely prepped.  A 25-gauge needle was introduced into the subacromial space.   A total of 2 cc of 40 mg/ml Kenalog and 4 cc of 1% lidocaine was injected.     The patient tolerated the procedure well without adverse effects.

## 2024-03-07 NOTE — TELEPHONE ENCOUNTER
Initiated authorization for Right shoulder injection CPT/HCPCS 72554,  with [employer] [place] automated line  Status: Approved-authorization is not required per health plan however may be subject to review once claim is submitted      Inj done in office

## 2024-04-11 ENCOUNTER — HOSPITAL ENCOUNTER (OUTPATIENT)
Dept: GENERAL RADIOLOGY | Facility: HOSPITAL | Age: 49
Discharge: HOME | End: 2024-04-11
Attending: PHYSICAL MEDICINE & REHABILITATION
Payer: COMMERCIAL

## 2024-04-11 DIAGNOSIS — S46.011A STRAIN OF RIGHT ROTATOR CUFF CAPSULE, INITIAL ENCOUNTER: ICD-10-CM

## 2024-04-11 PROCEDURE — 73030 X-RAY EXAM OF SHOULDER: CPT | Performed by: PHYSICAL MEDICINE & REHABILITATION

## 2024-04-17 ENCOUNTER — OFFICE VISIT (OUTPATIENT)
Dept: PHYSICAL MEDICINE AND REHAB | Facility: CLINIC | Age: 49
End: 2024-04-17
Payer: COMMERCIAL

## 2024-04-17 VITALS — WEIGHT: 230 LBS | OXYGEN SATURATION: 99 % | BODY MASS INDEX: 32 KG/M2 | HEART RATE: 89 BPM

## 2024-04-17 DIAGNOSIS — S46.011A STRAIN OF RIGHT ROTATOR CUFF CAPSULE, INITIAL ENCOUNTER: Primary | ICD-10-CM

## 2024-04-17 PROCEDURE — 99213 OFFICE O/P EST LOW 20 MIN: CPT | Performed by: PHYSICAL MEDICINE & REHABILITATION

## 2024-04-17 NOTE — PROGRESS NOTES
[facility]  Progress Note    CHIEF COMPLAINT:    Chief Complaint   Patient presents with    Follow - Up     03/06/24 R shoulder CSI and LOV. States 90% improvement after injection. No pain. Advil prn.        History of Present Illness:  David Barrientos is a 49 year old male who presents today for follow up for symptoms of right shoulder pain.  At his last visit I injected the shoulder and since then he has been feeling fantastic.  We also went over his x-ray.    PAST MEDICAL HISTORY:  No past medical history on file.    SURGICAL HISTORY:  Past Surgical History:   Procedure Laterality Date    Back surgery  2017    lower back     Knee surgery Right 2016       SOCIAL HISTORY:   Social History     Occupational History    Not on file   Tobacco Use    Smoking status: Never    Smokeless tobacco: Never   Vaping Use    Vaping status: Never Used   Substance and Sexual Activity    Alcohol use: No    Drug use: Never    Sexual activity: Not on file       CURRENT MEDICATIONS:   Current Outpatient Medications   Medication Sig Dispense Refill    DULOXETINE 60 MG Oral Cap DR Particles Take 1 capsule (60 mg total) by mouth in the morning 90 capsule 0    Meloxicam 15 MG Oral Tab Take 1 tablet (15 mg total) by mouth daily. 30 tablet 0    ergocalciferol 69006 units Oral Cap Take 50,000 Int'l Units by mouth. (Patient not taking: Reported on 9/17/2021)      Meloxicam 15 MG Oral Tab Take 15 mg by mouth daily.      Cyclobenzaprine HCl 10 MG Oral Tab Take 10 mg by mouth 3 (three) times daily as needed. (Patient not taking: Reported on 9/17/2021)      Albuterol Sulfate  (90 Base) MCG/ACT Inhalation Aero Soln Inhale 2 puffs into the lungs.      Montelukast Sodium 10 MG Oral Tab Take 10 mg by mouth nightly.      fluticasone-salmeterol 100-50 MCG/DOSE Inhalation Aerosol Powder, Breath Activated Inhale 1 puff into the lungs 2 (two) times daily.      omega-3 fatty acids 1000 MG Oral Cap Take  1,000 mg by mouth daily.      Misc Natural Products (Reflektion) Oral Cap Take by mouth.         ALLERGIES:   Allergies   Allergen Reactions    Amoxicillin HIVES    Mold SHORTNESS OF BREATH    Penicillins HIVES         PHYSICAL EXAM:   Pulse 89   Wt 230 lb (104.3 kg)   SpO2 99%   BMI 32.08 kg/m²     Body mass index is 32.08 kg/m².      General: No immediate distress  Extremities: No upper extremity edema bilaterally   Spine: full and painfree cervical ROM in all directions  Shoulders: full and painfree ROM   Neuro:   Cognition: alert & oriented x 3, attentive, comprehention intact, spontaneous speech intact  Strength:  Upper extremities have 5/5 strength  Sensation: Normal upper extremities  Reflexes: Normal upper extremities  Spurling's sign: neg        Data    Radiology Imaging:  I reviewed a plain film x-ray of the right shoulder showing a tiny bone spur off the inferior humeral head.  No other abnormalities.     ASSESSMENT AND PLAN:  1. Strain of right rotator cuff capsule, initial encounter  Vastly improved.  He is very happy.  It was explained he is mild shoulder arthritis.  Nothing to worry about.  He will return as needed            The patient was in agreement with the assessment and plan.  All questions were answered.        Matthew Downing MD  Physical Medicine and Rehabilitation/Sports Medicine  [facility]

## 2024-04-25 RX ORDER — DULOXETIN HYDROCHLORIDE 60 MG/1
60 CAPSULE, DELAYED RELEASE ORAL EVERY MORNING
Qty: 90 CAPSULE | Refills: 0 | Status: SHIPPED | OUTPATIENT
Start: 2024-04-25

## 2024-04-25 NOTE — TELEPHONE ENCOUNTER
Refill Request    Medication request: DULOXETINE 60 MG Oral Cap DR Particles.  Take 1 capsule (60 mg total) by mouth in the morning      LOV:04/17/2024 with Dr Downing  Due back to clinic per last office note:  He will return as needed   NOV: Visit date not found      [place]: 1/31/2024 #90    Urine drug screen (if applicable): N/A  Pain contract: N/A    LOV plan (if weaning or changing medications): 12/15/2023 OV notes: \"His neck is doing great on Cymbalta 60, continue indefinitely.

## 2024-07-12 RX ORDER — DULOXETIN HYDROCHLORIDE 60 MG/1
60 CAPSULE, DELAYED RELEASE ORAL EVERY MORNING
Qty: 90 CAPSULE | Refills: 0 | Status: SHIPPED | OUTPATIENT
Start: 2024-07-12

## 2024-07-12 NOTE — TELEPHONE ENCOUNTER
Refill Request    Medication request: DULOXETINE 60 MG Oral Cap DR Particles  Take 1 capsule by mouth in the morning     LOV:4/17/2024 Matthew Downing MD  Due back to clinic per last office note:  \"He will return as needed \"  NOV: Visit date not found      [place]: 4/25/2024 #90    Urine drug screen (if applicable): n/a  Pain contract: n/a    LOV plan (if weaning or changing medications): No mention of any medications         NOTE:  Patient has been on stable dose of Duloxetine and seen within last year. Ok to fill per protocol.

## 2024-10-14 RX ORDER — DULOXETIN HYDROCHLORIDE 60 MG/1
60 CAPSULE, DELAYED RELEASE ORAL EVERY MORNING
Qty: 90 CAPSULE | Refills: 0 | Status: SHIPPED | OUTPATIENT
Start: 2024-10-14

## 2024-10-14 NOTE — TELEPHONE ENCOUNTER
Refill Request    Medication request: DULOXETINE 60 MG Oral Cap DR Particles. Take 1 capsule by mouth in the morning     LOV: 04/17/2024 with Matthew Downing M.D.   Due back to clinic per last office note: Per Dr. Downing: \"He will return as needed.\"  NOV: Visit date not found      [place]: 07/12/2024 #90    Urine drug screen (if applicable): n/a  Pain contract: n/a    LOV plan (if weaning or changing medications): Not mentioned in LOV note.

## 2025-01-13 RX ORDER — DULOXETIN HYDROCHLORIDE 60 MG/1
60 CAPSULE, DELAYED RELEASE ORAL EVERY MORNING
Qty: 90 CAPSULE | Refills: 0 | Status: SHIPPED | OUTPATIENT
Start: 2025-01-13

## 2025-01-13 NOTE — TELEPHONE ENCOUNTER
Refill Request    Medication request:   DULOXETINE 60 MG Oral Cap DR Particles       LOV: 4/17/2024  RTC: PRN  NOV: Visit date not found      [place]: 10/14/2024 #90 days    UDS: (if applicable): N/A  Pain contract: N/A    LOV plan (if weaning or changing medications): N/A

## 2025-04-15 RX ORDER — DULOXETIN HYDROCHLORIDE 60 MG/1
60 CAPSULE, DELAYED RELEASE ORAL EVERY MORNING
Qty: 90 CAPSULE | Refills: 0 | Status: SHIPPED | OUTPATIENT
Start: 2025-04-15

## 2025-04-15 NOTE — TELEPHONE ENCOUNTER
Refill Request    Medication request:   DULOXETINE 60 MG Oral Cap DR Particles     LOV: 4/17/2024  RTC: PRN  NOV: Visit date not found      [place]: 1/13/2025 #90 days    UDS: (if applicable): N/A  Pain contract: N/A    LOV plan (if weaning or changing medications): N/A

## 2025-07-15 RX ORDER — DULOXETIN HYDROCHLORIDE 60 MG/1
60 CAPSULE, DELAYED RELEASE ORAL EVERY MORNING
Qty: 90 CAPSULE | Refills: 3 | Status: SHIPPED | OUTPATIENT
Start: 2025-07-15 | End: 2025-07-16

## 2025-07-15 NOTE — TELEPHONE ENCOUNTER
Refill Request    Medication request: DULOXETINE 60 MG Oral Cap DR Particles . TAKE ONE CAPSULE BY MOUTH EVERY DAY IN THE MORNING.     LOV: 04/17/2025 - Matthew Downing M.D.  Due back to clinic per last office note: Per Dr. Downing: \"He will return as needed\"   NOV: Visit date not found      [place]: 04/15/2025 #90 - 90 days supply    Urine drug screen (if applicable): n/a  Pain contract: n/a    LOV plan (if weaning or changing medications): Not mentioned in the LOV note. Per 12/15/2023 OV notes: \"His neck is doing great on Cymbalta 60, continue indefinitely.\"

## 2025-07-16 RX ORDER — DULOXETIN HYDROCHLORIDE 60 MG/1
60 CAPSULE, DELAYED RELEASE ORAL EVERY MORNING
Qty: 90 CAPSULE | Refills: 0 | Status: SHIPPED | OUTPATIENT
Start: 2025-07-16

## 2025-07-16 NOTE — TELEPHONE ENCOUNTER
Refill Request    Medication request: DULoxetine 60 MG Oral Cap DR Particles. TAKE ONE CAPSULE BY MOUTH ONCE DAILY IN THE MORNING      LOV:Visit date not found   Due back to clinic per last office note:  V  NOV: Visit date not found      [place]: 4/15/25 #90 (90 days)    Urine drug screen (if applicable): N/A  Pain contract: N/A    LOV plan (if weaning or changing medications): no changes mentioned

## 2025-07-25 ENCOUNTER — OFFICE VISIT (OUTPATIENT)
Dept: PHYSICAL MEDICINE AND REHAB | Facility: CLINIC | Age: 50
End: 2025-07-25
Payer: COMMERCIAL

## 2025-07-25 VITALS — WEIGHT: 208 LBS | BODY MASS INDEX: 29.12 KG/M2 | HEIGHT: 71 IN | RESPIRATION RATE: 14 BRPM

## 2025-07-25 DIAGNOSIS — M17.31 POST-TRAUMATIC OSTEOARTHRITIS OF RIGHT KNEE: Primary | ICD-10-CM

## 2025-07-25 PROCEDURE — 3008F BODY MASS INDEX DOCD: CPT | Performed by: PHYSICAL MEDICINE & REHABILITATION

## 2025-07-25 PROCEDURE — 99214 OFFICE O/P EST MOD 30 MIN: CPT | Performed by: PHYSICAL MEDICINE & REHABILITATION

## 2025-07-25 RX ORDER — MELOXICAM 15 MG/1
15 TABLET ORAL DAILY
Qty: 30 TABLET | Refills: 0 | Status: SHIPPED | OUTPATIENT
Start: 2025-07-25

## 2025-07-25 NOTE — PROGRESS NOTES
[facility]  Progress Note    CHIEF COMPLAINT:    Chief Complaint   Patient presents with    Follow - Up     Follow up. LOV: 4/1/25 for shoulder. Patient c/o right knee swelling/stiffness. Denies N/T. Denies weakness. Denies PT. Admits to knee XR last year at [facility]. Admits to previous knee injuries.LOP- 0./10.       History of Present Illness:  David Barrientos is a 50 year old male who presents today for follow up for symptoms of right knee pain.  He has chronic right knee swelling from a remote meniscal surgery.  Recently he went to a wedding and did a lot of dancing, he felt the knee was stiffer and he felt fullness over the popliteal fossa.  He just wants to be sure nothing is wrong as he is going on a trip to [place] within a few months.    PAST MEDICAL HISTORY:  Past Medical History[1]    SURGICAL HISTORY:  Past Surgical History[2]    SOCIAL HISTORY:   Social History     Occupational History    Not on file   Tobacco Use    Smoking status: Never    Smokeless tobacco: Never   Vaping Use    Vaping status: Never Used   Substance and Sexual Activity    Alcohol use: No    Drug use: Never    Sexual activity: Not on file       CURRENT MEDICATIONS:   Current Medications[3]    ALLERGIES:   Allergies[4]        PHYSICAL EXAM:   Resp 14   Ht 71\"   Wt 208 lb (94.3 kg)   BMI 29.01 kg/m²     Body mass index is 29.01 kg/m².      General: No immediate distress  Extremities: No lower extremity edema bilaterally   Spine: full and painfree lumbar ROM in all directions  Hips: full and painfree ROM   Knees: Slight right flexion contracture, bogginess but no fluid filled effusion, probably synovitis.  No pain with range of motion  Neuro:   Cognition: alert & oriented x 3, attentive, able to follow 2 step commands, comprehention intact, spontaneous speech intact  Strength: Lower extremities have 5/5 strength  Sensation: Normal lower extremities  Reflexes: Normal lower  extremities  SLR: neg        Data    Radiology Imagin.  A right knee plain film x-ray from  was unremarkable via [facility] Everywhere.  2.  A right knee MRI from  showed chondromalacia patella, a medial meniscal tear, osteoarthritic change including joint space narrowing and osteophyte formation.  There is also a Baker's cyst.      ASSESSMENT AND PLAN:  1. Post-traumatic osteoarthritis of right knee  Right now he is at his baseline.  He states meloxicam use for other body part seems to help his knee.  I gave him that for a month.  If no better we can consider knee injection prior to his trip.  He has degenerative posttraumatic osteoarthritis of the knee, stable.  Will tyree-ray and MRI if symptoms worsen.        RTC as needed      The patient was in agreement with the assessment and plan.  All questions were answered.        Matthew Downing MD  Physical Medicine and Rehabilitation/Sports Medicine  [facility]           [1] No past medical history on file.  [2]   Past Surgical History:  Procedure Laterality Date    Back surgery  2017    lower back     Knee surgery Right 2016   [3]   Current Outpatient Medications   Medication Sig Dispense Refill    Meloxicam 15 MG Oral Tab Take 1 tablet (15 mg total) by mouth daily. 30 tablet 0    DULOXETINE 60 MG Oral Cap DR Particles TAKE ONE CAPSULE BY MOUTH ONCE DAILY IN THE MORNING 90 capsule 0    Meloxicam 15 MG Oral Tab Take 1 tablet (15 mg total) by mouth daily. 30 tablet 0    ergocalciferol 46625 units Oral Cap Take 50,000 Int'l Units by mouth. (Patient not taking: Reported on 2021)      Meloxicam 15 MG Oral Tab Take 15 mg by mouth daily.      Cyclobenzaprine HCl 10 MG Oral Tab Take 10 mg by mouth 3 (three) times daily as needed. (Patient not taking: Reported on 2021)      Albuterol Sulfate  (90 Base) MCG/ACT Inhalation Aero Soln Inhale 2 puffs into the lungs.      Montelukast Sodium 10 MG Oral Tab Take 10 mg by mouth nightly.       fluticasone-salmeterol 100-50 MCG/DOSE Inhalation Aerosol Powder, Breath Activated Inhale 1 puff into the lungs 2 (two) times daily.      omega-3 fatty acids 1000 MG Oral Cap Take 1,000 mg by mouth daily.      Misc Natural Products (Animoto) Oral Cap Take by mouth.     [4]   Allergies  Allergen Reactions    Amoxicillin HIVES    Mold SHORTNESS OF BREATH    Penicillins HIVES

## 2025-08-01 ENCOUNTER — MED REC SCAN ONLY (OUTPATIENT)
Dept: PHYSICAL MEDICINE AND REHAB | Facility: CLINIC | Age: 50
End: 2025-08-01

## (undated) NOTE — LETTER
WHERE IS YOUR PAIN NOW?  Jaiden the areas on your body where you feel the described sensations.  Use the appropriate symbol.  Jaiden the areas of radiation.  Include all affected areas.  Just to complete the picture, please draw in the face.     ACHE:  ^ ^ ^   NUMBNESS:  0000   PINS & NEEDLES:  = = = =                              ^ ^ ^                       0000              = = = =                                    ^ ^ ^                       0000            = = = =      BURNING:  XXXX   STABBING: ////                  XXXX                ////                         XXXX          ////     Please jaiden the line below indicating your degree of pain right now  with 0 being no pain 10 being the worst pain possible.                                         0             1             2              3             4              5              6              7             8             9             10         Patient Signature:

## (undated) NOTE — LETTER
[address]   Date:   6/28/2019     Name:   Marcia Matos    YOB: 1975   MRN:   [id number]       WHERE IS YOUR PAIN NOW? Carlos the areas on your body where you feel the described sensations.   Use the appropriate sy

## (undated) NOTE — LETTER
[address]   Date:   11/1/2019     Name:   Hemant Barfield    YOB: 1975   MRN:   [id number]       WHERE IS YOUR PAIN NOW? Carlos the areas on your body where you feel the described sensations.   Use the appropriate sy

## (undated) NOTE — LETTER
Date: 2024      Patient Name: David Barrientos      : 1975        Thank you for choosing [facility] as your health care provider. Your physician has deemed the following medical service(s) necessary. However, your insurance plan may not pay for all of your health care and costs and may deny payment for this service. The fact that your insurance plan does not pay for an item or service does not mean you should not receive it. The purpose of this form is to help you make an informed decision about whether or not you want to receive this service(s) that may not be paid for by your insurance plan.    CPT Code Description     Cost     Right shoulder injection       I understand that the above mentioned service(s) or supply may not be covered by my insurance company. I agree to be financially responsible for the cost of this service or supply in the event of my insurance denies payment as a non-covered benefit.        ______________________________________________________________________  Signature of Patient or Patient's Representative  Relationship  Date    ______________________________________________________________________  Signature of Witness to signing of form   Printed Name

## (undated) NOTE — LETTER
AUTHORIZATION FOR SURGICAL OPERATION OR OTHER PROCEDURE    1. I hereby authorize Dr. Matthew Downing and the [facility] staff assigned to my case to perform the following operation and/or procedure at the [facility]:    Right shoulder injection     2.  My physician has explained the nature and purpose of the operation or other procedure, possible alternative methods of treatment, the risks involved, and the possibility of complication to me.  I acknowledge that no guarantee has been made as to the result that may be obtained.  3.  I recognize that, during the course of this operation, or other procedure, unforseen conditions may necessitate additional or different procedure than those listed above.  I, therefore, further authorize and request that the above named physician, his/her physician assistants or designees perform such procedures as are, in his/her professional opinion, necessary and desirable.  4.  Any tissue or organs removed in the operation or other procedure may be disposed of by and at the discretion of the [facility] staff and [facility].  5.  I understand that in the event of a medical emergency, I will be transported by local paramedics to [facility] or other hospital emergency department.  6.  I certify that I have read and fully understand the above consent to operation and/or other procedure.    7.  I acknowledge that my physician has explained sedation/analgesia administration to me including the risks and benefits.  I consent to the administration of sedation/analgesia as may be necessary or desirable in the judgement of my physician.    Witness signature: ___________________________________________________ Date:  ______/______/_____                    Time:  ________ A.M.  P.MSusan Barrientos  2/24/1975  [id number]         Patient signature:  ___________________________________________________      Statement of Physician  My signature below affirms that  prior to the time of the procedure, I have explained to the patient and/or his/her guardian, the risks and benefits involved in the proposed treatment and any reasonable alternative to the proposed treatment.  I have also explained the risks and benefits involved in the refusal of the proposed treatment and have answered the patient's questions.                        Date:  ______/______/_______  Provider                      Signature:  __________________________________________________________       Time:  ___________ AALLISON HIGGINSMSusan

## (undated) NOTE — LETTER
[facility] SURGERY SCHEDULING FORM   [address] [place] [address]   [phone] (scheduling phone) [phone] (scheduling fax)     PATIENT INFORMATION   Last Name:      Cara Rodriguez      First Name:    Bailey Bueno [x]  Yes  []  No or using our own   Allergies: Amoxicillin; Mold; Penicillins         Completed by:    Hawa Carlos      Date:    9/11/2019

## (undated) NOTE — LETTER
[facility] SURGERY SCHEDULING FORM   [address] [facility]   [phone] (scheduling phone) [phone] (scheduling fax)     PATIENT INFORMATION   Last Name:      Samuel Schwab      First Name:    Annetta Lira Allergies: Amoxicillin; Mold; Penicillins         Completed by:    Makeda Lomax      Date:    7/3/2019

## (undated) NOTE — LETTER
[facility] [place]   Date:   1/15/2024     Name:   David Barrientos    YOB: 1975   MRN:   [id number]       WHERE IS YOUR PAIN NOW?  Jaiden the areas on your body where you feel the described sensations.  Use the appropriate symbol.  Jaiden the areas of radiation.  Include all affected areas.  Just to complete the picture, please draw in the face.     ACHE:  ^ ^ ^   NUMBNESS:  0000   PINS & NEEDLES:  = = = =                              ^ ^ ^                       0000              = = = =                                    ^ ^ ^                       0000            = = = =      BURNING:  XXXX   STABBING: ////                  XXXX                ////                         XXXX          ////     Please jaiden the line below indicating your degree of pain right now  with 0 being no pain 10 being the worst pain possible.                                         0             1             2              3             4              5              6              7             8             9             10         Patient Signature:

## (undated) NOTE — LETTER
[address]   Date:   9/3/2019     Name:   Mayda Zaman    YOB: 1975   MRN:   [id number]       WHERE IS YOUR PAIN NOW? Carlos the areas on your body where you feel the described sensations.   Use the appropriate sym

## (undated) NOTE — LETTER
[address]   Date:   9/17/2021     Name:   Mayda Zaman    YOB: 1975   MRN:   [id number]       [facility]?   Carlos the areas on your body where you feel the descri

## (undated) NOTE — LETTER
[facility] [address] , [place]   Date:   12/15/2023     Name:   James Bradley    YOB: 1975   MRN:   [id number]       [facility]? Jaiden the areas on your body where you feel the described sensations. Use the appropriate symbol. Bam Elysian the areas of radiation. Include all affected areas. Just to complete the picture, please draw in the face. ACHE:  ^ ^ ^   NUMBNESS:  0000   PINS & NEEDLES:  = = = =                              ^ ^ ^                       0000              = = = =                                    ^ ^ ^                       0000            = = = =      BURNING:  XXXX   STABBING: ////                  XXXX                ////                         XXXX          ////     Please jaiden the line below indicating your degree of pain right now  with 0 being no pain 10 being the worst pain possible.                                          0             1             2              3             4              5              6              7             8             9             10         Patient Signature:

## (undated) NOTE — LETTER
[facility] SURGERY SCHEDULING FORM   [address] [facility]   [phone] (scheduling phone) [phone] (scheduling fax)     PATIENT INFORMATION   Last Name:      Emilie Mccain      First Name:    Shilpi Ansari []  No or using our own   Allergies: Amoxicillin; Mold; Penicillins    **PATIENT REQUESTS 9:30AM SPOT IF POSSIBLE**     Completed by:   Elmer Liu      Date:   3/4/2019

## (undated) NOTE — LETTER
[address]  [address] [facility]  Dept: [phone]    nAnette Guzman MD  Physical Medicine    Injection Instructions    We will check with your insurance company for [address] [place] [address] hydrocodone found in Vicodin, Lortab, Tylenol and Ultram.  II. Most injections are done with local anesthetics. Some injections may require sedation. Hold food and drink if having IV sedation for the injection.  [facility] will give you details linh

## (undated) NOTE — LETTER
[address]  [address] [facility]  Dept: [phone]    Beatrice Lindo MD  Physical Medicine    Post Injection/Procedure Instructions    PAIN:  Your usual pain should gradually decrea your physician, please go to the nearest emergency room. COMMON SIDE EFFECTS:  · Numbness for 4 to 8 hours due to the local anesthetic. · Minor pain at the injection site. · A small amount of bleeding at the injection site.   · If a steroid medicatio

## (undated) NOTE — LETTER
23    Patient: Flash Mosley  : 1975 Visit date: 2023    Dear  Kathya Alejo MD    Thank you for referring Flash Mosley to my practice. Please find my assessment and plan below. Assessment   Anal fissure  Anal skin tags      Plan   The patient presents for evaluation of several anorectal related issues. The patient is here seeking a third opinion regarding his issues. He states he has had intermittent fissures for the last 5-6 years. He states his fissures flare after passing a hard bowel movement, then they will heal, then he will be pain-free for a week to 2 and then they will recur when he passes an additional hard bowel movement. The patient rates his pain with his fissures at a 5/10. His pain is associated with bowel movements and he has a ripping sensation when having bowel movements. He states the pain can last 1 to 2 hours following a bowel movement. He states he has tried diltiazem ointment in the past, with minimal relief. He has also tried sitz bath's, Epsom salt baths with minimal relief. He does have a history of constipation. He takes daily MiraLAX and stool softeners, but still has issues with constipation. He denies diarrhea. He denies hematochezia, melena or mucus in his stools. He denies narrowing of his stools. He does have rare instances of bright red blood per rectum when wiping following bowel movements. He has seen Dr. Bambi Hatfield for this issue. Dr. Bambi Hatfield recommended a subcutaneous lateral internal sphincterotomy and excision of anal skin tag. He spoke with the patient about Botox, but recommended surgery. The patient denies any significant past medical history. He does not take any blood thinners. He denies a personal history of IBS, Crohn's disease or ulcerative colitis. The patient has had no previous operations on his abdomen or his rectum. The patient's father had colon cancer at the age of 71.   He denies any other first or second-degree relatives with history of colon cancer/colon polyps. He denies any first or second-degree relatives with history of uterine cancer. Clinical exam of the rectum reveals no external hemorrhoids, fistulae or abscesses. He has a posterior anal fissure to the left of midline. He has anal skin tags in the right posterior and left anterior locations. Digital rectal exam reveals no palpable masses. Basal tone 4/4 and maximum squeeze pressure 4/4. I explained to the patient that I do recommend he undergo a lateral internal sphincterotomy and excision of multiple skin tags. The patient states this is exactly what Dr. Ryley Anthony recommended. He is currently scheduled for a lateral internal sphincterotomy and excision of skin tag with Dr. Ryley Anthony on February 6, 2022. We were unable to offer the patient a sooner date than February 6, 2022 for surgery. I recommend he proceed with Dr. Ryley Anthony for this operation. I also recommend the patient continue MiraLAX daily to ensure soft stools. He should also ensure he is consuming an adequate amount of fiber and drinking.           Sincerely,       Suzanna Jean MD   CC: No Recipients

## (undated) NOTE — LETTER
[address], [facility]   Date:   10/7/2022     Name:   Maverick Arevalo    YOB: 1975   MRN:   [id number]       [facility]? Jaiden the areas on your body where you feel the described sensations. Use the appropriate symbol. Jenn Helms the areas of radiation. Include all affected areas. Just to complete the picture, please draw in the face. ACHE:  ^ ^ ^   NUMBNESS:  0000   PINS & NEEDLES:  = = = =                              ^ ^ ^                       0000              = = = =                                    ^ ^ ^                       0000            = = = =      BURNING:  XXXX   STABBING: ////                  XXXX                ////                         XXXX          ////     Please jaiden the line below indicating your degree of pain right now  with 0 being no pain 10 being the worst pain possible.                                          0             1             2              3             4              5              6              7             8             9             10         Patient Signature:

## (undated) NOTE — LETTER
[address]   Date:   7/26/2019     Name:   Jay Her    YOB: 1975   MRN:   [id number]       WHERE IS YOUR PAIN NOW? Carlos the areas on your body where you feel the described sensations.   Use the appropriate sy